# Patient Record
Sex: FEMALE
[De-identification: names, ages, dates, MRNs, and addresses within clinical notes are randomized per-mention and may not be internally consistent; named-entity substitution may affect disease eponyms.]

---

## 2019-01-10 ENCOUNTER — HOSPITAL ENCOUNTER (OUTPATIENT)
Dept: HOSPITAL 5 - OR | Age: 39
Discharge: HOME | End: 2019-01-10
Attending: UROLOGY
Payer: MEDICAID

## 2019-01-10 VITALS — DIASTOLIC BLOOD PRESSURE: 66 MMHG | SYSTOLIC BLOOD PRESSURE: 112 MMHG

## 2019-01-10 DIAGNOSIS — D64.9: ICD-10-CM

## 2019-01-10 DIAGNOSIS — M19.90: ICD-10-CM

## 2019-01-10 DIAGNOSIS — F41.9: ICD-10-CM

## 2019-01-10 DIAGNOSIS — N17.9: ICD-10-CM

## 2019-01-10 DIAGNOSIS — Z99.2: ICD-10-CM

## 2019-01-10 DIAGNOSIS — Z88.2: ICD-10-CM

## 2019-01-10 DIAGNOSIS — Z79.899: ICD-10-CM

## 2019-01-10 DIAGNOSIS — F17.200: ICD-10-CM

## 2019-01-10 DIAGNOSIS — K21.9: ICD-10-CM

## 2019-01-10 DIAGNOSIS — N20.0: Primary | ICD-10-CM

## 2019-01-10 DIAGNOSIS — Z93.6: ICD-10-CM

## 2019-01-10 DIAGNOSIS — Z72.89: ICD-10-CM

## 2019-01-10 DIAGNOSIS — Z98.891: ICD-10-CM

## 2019-01-10 DIAGNOSIS — Z98.890: ICD-10-CM

## 2019-01-10 DIAGNOSIS — Z88.1: ICD-10-CM

## 2019-01-10 DIAGNOSIS — Z88.8: ICD-10-CM

## 2019-01-10 DIAGNOSIS — Z90.710: ICD-10-CM

## 2019-01-10 DIAGNOSIS — N18.6: ICD-10-CM

## 2019-01-10 PROCEDURE — 50590 FRAGMENTING OF KIDNEY STONE: CPT

## 2019-01-10 PROCEDURE — 82803 BLOOD GASES ANY COMBINATION: CPT

## 2019-01-10 NOTE — SHORT STAY SUMMARY
Short Stay Documentation


Date of service: 01/10/19





- History


H&P: obtained from office





- Allergies and Medications


Current Medications: 


                                    Allergies





ciprofloxacin [From Cipro] Allergy (Verified 01/04/19 13:48)


   Seizure


erythromycin base Allergy (Verified 01/04/19 13:48)


   Anaphylaxis


ketorolac [From Toradol] Allergy (Verified 01/04/19 13:48)


   Head swells, itching


sulfamethoxazole [From Bactrim] Adverse Reaction (Verified 01/10/19 07:55)


   Diarrhea


trimethoprim [From Bactrim] Adverse Reaction (Verified 01/10/19 07:56)


   Diarrhea


   PT STATES IT "REALLY MESSES ME UP" BUT I DON'T HAVE A SEIZURE WITH IT SO I'M 

   OKAY TAKING IT. I TOLD HER I WAS STILL GOING TO LIST IT AS AN ALLERGY 





                                Home Medications











 Medication  Instructions  Recorded  Confirmed  Last Taken  Type


 


HYDROcodone/APAP 5-325 [[place] 1 each PO Q6HR PRN 01/04/19 01/10/19 2 Weeks Ago 

History





5/325]    ~12/27/18 








Active Medications





Cefazolin Sodium (Ancef/Sterile Water 2 Gm/20 Ml)  2 gm IV PREOP NR


   Stop: 01/10/19 23:59


Famotidine (Pepcid)  20 mg IV PREOP NR


   Stop: 01/10/19 20:00


Hydromorphone HCl (Dilaudid)  0.5 mg IV Q10MIN PRN


   PRN Reason: Pain , Severe (7-10)


   Stop: 01/10/19 20:00


   Last Admin: 01/10/19 10:05 Dose:  0.5 mg


   Documented by: 


Lactated Ringer's (Lactated Ringers)  1,000 mls @ 100 mls/hr IV AS DIRECT ONEL


Midazolam HCl (Versed)  2 mg IV PREOP NR


   Stop: 01/10/19 23:59











- Brief post op/procedure progress note


Date of procedure: 01/10/19


Pre-op diagnosis: Right renal stone 13mm


Post-op diagnosis: same


Procedure: 





right renal eswl


Anesthesia: GETA


Findings: 





good vis


Surgeon: JAZMIN SANABRIA


Estimated blood loss: minimal


Pathology: none


Condition: stable





- Hospital course


Hospital course: 





[facility]





- Disposition


Condition at discharge: Good


Disposition: DC-01 TO HOME OR SELFCARE





Short Stay Discharge Plan


Activity: advance as tolerated


Diet: advance as tolerated


Follow up with: 


ALFREDO BURNETT MD [Staff Physician] - 7 Days

## 2019-01-14 NOTE — OPERATIVE REPORT
PREOPERATIVE DIAGNOSIS:  Right renal 13 mm stone.





POSTOPERATIVE DIAGNOSIS:  Right renal 13 mm stone.





PROCEDURE:  Right renal extracorporeal shockwave lithotripsy.





SURGEON:  Nehemiah Huffman MD





ANESTHESIA:  General.





SPECIMENS:  None.





ESTIMATED BLOOD LOSS:  Minimal.





COMPLICATIONS:  None.





FINDINGS:  Good visualization, mild decreased density at the end of procedure.





CLINICAL INDICATIONS:  The patient counseled RCBA, antibiotics, SCD.





DESCRIPTION OF PROCEDURE:  The patient was transferred to the OR suite in supine

position, anesthesia begun.  Biplanar fluoroscopy was used to target the stone

with an F2, good visualization of the stone.  A total of 2500 shocks were

delivered, maximum of 7 kilovolts, intermittent repositioning done as necessary.

 At the end of the first procedure, there was mild-to-moderate decreased density

of the stone.  The patient was awakened and transferred to PACU in good and

stable condition.





PLAN:  Stage for future additional treatment of stone.





DD: 01/14/2019 09:38

DT: 01/14/2019 10:22

JOB# [id number]  [id number]

ATS/NTS

## 2020-09-09 ENCOUNTER — OFFICE VISIT (OUTPATIENT)
Dept: URBAN - METROPOLITAN AREA CLINIC 70 | Facility: CLINIC | Age: 40
End: 2020-09-09